# Patient Record
Sex: FEMALE | Employment: PART TIME | URBAN - METROPOLITAN AREA
[De-identification: names, ages, dates, MRNs, and addresses within clinical notes are randomized per-mention and may not be internally consistent; named-entity substitution may affect disease eponyms.]

---

## 2024-06-25 ENCOUNTER — OFFICE VISIT (OUTPATIENT)
Age: 24
End: 2024-06-25

## 2024-06-25 VITALS
WEIGHT: 180 LBS | BODY MASS INDEX: 33.13 KG/M2 | RESPIRATION RATE: 17 BRPM | TEMPERATURE: 98.1 F | SYSTOLIC BLOOD PRESSURE: 118 MMHG | HEIGHT: 62 IN | OXYGEN SATURATION: 98 % | HEART RATE: 78 BPM | DIASTOLIC BLOOD PRESSURE: 85 MMHG

## 2024-06-25 DIAGNOSIS — J40 BRONCHITIS: Primary | ICD-10-CM

## 2024-06-25 RX ORDER — BENZONATATE 100 MG/1
100 CAPSULE ORAL 3 TIMES DAILY PRN
Qty: 30 CAPSULE | Refills: 0 | Status: SHIPPED | OUTPATIENT
Start: 2024-06-25 | End: 2024-07-05

## 2024-06-25 RX ORDER — AZITHROMYCIN 250 MG/1
TABLET, FILM COATED ORAL
Qty: 6 TABLET | Refills: 0 | Status: SHIPPED | OUTPATIENT
Start: 2024-06-25 | End: 2024-07-05

## 2024-06-25 ASSESSMENT — ENCOUNTER SYMPTOMS: COUGH: 1

## 2024-06-25 NOTE — PROGRESS NOTES
Melanie Escobar (:  2000) is a 24 y.o. female,New patient, here for evaluation of the following chief complaint(s):  Congestion, Cough, and Pharyngitis (She has been sick for about a week and wanting to make sure that is not sick and it is just allergies )      ASSESSMENT/PLAN:  1. Bronchitis  - azithromycin (ZITHROMAX) 250 MG tablet; 500mg on day 1 followed by 250mg on days 2 - 5  Dispense: 6 tablet; Refill: 0  - benzonatate (TESSALON) 100 MG capsule; Take 1 capsule by mouth 3 times daily as needed for Cough  Dispense: 30 capsule; Refill: 0   -KEEP WELL HYDRATION    Return if symptoms worsen or fail to improve.    SUBJECTIVE/OBJECTIVE:  PRESENT TO CLINIC WITH COUGH, CONGESTION AND YELLOW PHLEGM FOR ONE WEEK. NO SICK CONTACT AT HOME. NO FEVER. AND NO RUNNY NOSE THROAT NOT HURT, NO DIFFICULTY WITH SWALLOWING. NO SICK CONTACT AT HOME.      History provided by:  Patient  Cough    Pharyngitis  Associated symptoms: congestion and cough        Vitals:    24 0855   BP: 118/85   Site: Left Upper Arm   Position: Sitting   Cuff Size: Large Adult   Pulse: 78   Resp: 17   Temp: 98.1 °F (36.7 °C)   SpO2: 98%   Weight: 81.6 kg (180 lb)   Height: 1.575 m (5' 2\")       Review of Systems   HENT:  Positive for congestion.    Respiratory:  Positive for cough.        Physical Exam  Constitutional:       Appearance: Normal appearance.   HENT:      Head: Normocephalic and atraumatic.      Nose: Congestion present.      Mouth/Throat:      Mouth: Mucous membranes are moist.   Eyes:      Pupils: Pupils are equal, round, and reactive to light.   Pulmonary:      Effort: Pulmonary effort is normal.      Breath sounds: Normal breath sounds.   Musculoskeletal:         General: Normal range of motion.      Cervical back: Normal range of motion and neck supple.   Neurological:      Mental Status: She is alert and oriented to person, place, and time.   Psychiatric:         Mood and Affect: Mood normal.           An electronic signature